# Patient Record
Sex: MALE | Race: WHITE
[De-identification: names, ages, dates, MRNs, and addresses within clinical notes are randomized per-mention and may not be internally consistent; named-entity substitution may affect disease eponyms.]

---

## 2020-01-24 ENCOUNTER — HOSPITAL ENCOUNTER (EMERGENCY)
Dept: HOSPITAL 56 - MW.ED | Age: 25
Discharge: HOME | End: 2020-01-24
Payer: COMMERCIAL
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
## 2020-01-24 DIAGNOSIS — S61.216A: Primary | ICD-10-CM

## 2020-01-24 DIAGNOSIS — Z23: ICD-10-CM

## 2020-01-24 PROCEDURE — 12002 RPR S/N/AX/GEN/TRNK2.6-7.5CM: CPT

## 2020-01-24 PROCEDURE — 90471 IMMUNIZATION ADMIN: CPT

## 2020-01-24 PROCEDURE — 90715 TDAP VACCINE 7 YRS/> IM: CPT

## 2020-01-24 PROCEDURE — 99283 EMERGENCY DEPT VISIT LOW MDM: CPT

## 2020-01-24 PROCEDURE — 73130 X-RAY EXAM OF HAND: CPT

## 2020-01-24 NOTE — CR
Right hand: 4 views of the right hand were obtained which includes a 

lateral view of the fifth digit.

 

Soft tissue injury is seen within the fifth finger.  Joint spaces are 

preserved within the right hand.  No fracture, dislocation or other 

bony abnormality is seen.

 

Impression:

1.  Soft tissue injury within the fifth finger.

2.  No bony abnormality is appreciated.

 

Diagnostic code #2

 

Study was dictated in [place] Standard Time

## 2020-01-24 NOTE — EDM.PDOC
ED HPI GENERAL MEDICAL PROBLEM





- General


Chief Complaint: Upper Extremity Injury/Pain


Stated Complaint: RT HAND/RT PINKIE FINGER INJURY


Time Seen by Provider: 01/24/20 13:39


Source of Information: Reports: Patient


History Limitations: Reports: No Limitations





- History of Present Illness


INITIAL COMMENTS - FREE TEXT/NARRATIVE: 


HISTORY AND PHYSICAL:





History of present illness:


Patient is a 25-year-old male who presents to the ED today with concern of 

right hand pinky injury that occurred just prior to arrival to the ED.  Patient 

states he was changing a tire when the cart fell off the jack and his pinky 

finger was pinched between the bumper of his vehicle and the tire.  Patient 

states he is not up-to-date on his tetanus.  Patient denies any other symptoms 

or concerns.





Patient denies fever, chills, chest pain, shortness of breath, or cough. Denies 

headache, neck stiff ness, change in vision, syncope, or near syncope. Denies 

nausea, vomiting, abdominal pain, diarrhea, constipation, or dysuria. Has not 

noted any blood in urine or stool. Patient has been eating and drinking 

appropriately.





Review of systems: 


As per history of present illness and below otherwise all systems reviewed and 

negative.





Past medical history: 


As per history of present illness and as reviewed below otherwise 

noncontributory.





Surgical history: 


As per history of present illness and as reviewed below otherwise 

noncontributory.





Social history: 


See social history for further information





Family history: 


As per history of present illness and as reviewed below otherwise 

noncontributory.





Physical exam:


General: Patient is alert, oriented, and in no acute distress. Patient sitting 

comfortably on exam table.


HEENT: Atraumatic, normocephalic, pupils equal and reactive bilaterally, 

negative for conjunctival pallor or scleral icterus, mucous membranes moist, 

TMs normal bilaterally, throat clear, neck supple, nontender, trachea midline. 

No drooling or trismus noted. No meningeal signs. No hot potato voice noted. 


Lungs: Clear to auscultation, breath sounds equal bilaterally, chest nontender.


Heart: S1S2, regular rate and rhythm without overt murmur


Abdomen: Soft, nondistended, nontender. Negative for masses or 

hepatosplenomegaly. Negative for costovertebral tenderness.


Pelvis: Stable nontender.


Genitourinary: Deferred.


Rectal: Deferred.


Skin: Intact, warm, dry. No lesions or rashes noted.


Extremities: Negative for cords or calf pain. Neurovascular unremarkable.  

Patient has full range of motion of complete right upper extremity including 

all digits without deficit.  Radial pulses grossly intact of the right upper 

extremity with capillary refill less than 2 seconds. There is a 3.5cm gaping 

laceration of the right medial 5th digit with tendon exposed underlying but not 

damaged and not bleeding.


Neuro: Awake, alert, oriented. Cranial nerves II through XII unremarkable. 

Cerebellum unremarkable. Motor and sensory unremarkable throughout. Exam 

nonfocal.





Notes:


Discussed importance for follow-up with an orthopedic provider.


Voices understanding and is agreeable to plan of care. Denies any further 

questions or concerns at this time.





Diagnostics:


hand XR





Therapeutics:


Lidocaine, bupivicaine, sutures, tdap





Prescription:


Keflex





Impression: 


Finger laceration, right, 5th digit





Plan:


1. Keep the area clean and dry. Continue to monitor for signs of infection as 

discussed. Sutures to be removed in 7-10 days.


2. Tylenol and/or ibuprofen as directed and as needed for pain management and 

discomfort.


3. Please follow-up with orthopedic provider as discussed. Return to the ED as 

needed and as discussed.








Definitive disposition and diagnosis as appropriate pending reevaluation and 

review of above.





  ** R Hand


Pain Score (Numeric/FACES): 5





- Related Data


 Allergies











Allergy/AdvReac Type Severity Reaction Status Date / Time


 


No Known Allergies Allergy   Verified 01/24/20 13:47











Home Meds: 


 Home Meds





. [No Known Home Meds]  01/24/20 [History]











Past Medical History





- Past Health History


Medical/Surgical History: Denies Medical/Surgical History





- Infectious Disease History


Infectious Disease History: Reports: Chicken Pox





Social & Family History





- Family History


Family Medical History: Noncontributory





- Tobacco Use


Smoking Status *Q: Never Smoker


Second Hand Smoke Exposure: No





- Caffeine Use


Caffeine Use: Reports: Coffee





- Recreational Drug Use


Recreational Drug Use: No





Review of Systems





- Review of Systems


Review Of Systems: Comprehensive ROS is negative, except as noted in HPI.





ED EXAM, GENERAL





- Physical Exam


Exam: See Below (see dictation)





ED TRAUMA EXTREMITY PROCEDURES





- Laceration/Wound Repair


  ** Right Digit - 5th (Baby)


Lac/Wound Length In cm: 3.5


Appearance: Muscle, Irregular, Mildly Contaminated


Distal NVT: Neuro & Vascular Intact, No Tendon Injury


Anesthetic Type: Digital


Local Anesthesia - Lidocaine (Xylocaine): 1% Plain


Local Anesthesia - Bupivicaine (Marcaine): 0.5% Plain


Local Anesthetic Volume: Other (10)


Skin Prep: Chlorhexidine (Hibiciens), Saline


Saline Irrigation (cc's): 120


Exploration/Debridement/Repair: Wound Explored, In a Bloodless Field, Explored 

to Base, No Foreign Material Found


Closed With: Sutures


Suture Size: 4-0


# of Sutures: 9


Suture Type: Interrupted


Drain Placement: No


Sterile Dressing Applied: Nurse


Tetanus Status Addressed: Yes


Complications: No





Course





- Vital Signs


Last Recorded V/S: 


 Last Vital Signs











Temp  97.5 F   01/24/20 13:48


 


Pulse  89   01/24/20 13:48


 


Resp  16   01/24/20 13:48


 


BP  149/80 H  01/24/20 13:48


 


Pulse Ox  97   01/24/20 13:48














- Orders/Labs/Meds


Orders: 


 Active Orders 24 hr











 Category Date Time Status


 


 Vaccines to be Administered [RC] PER UNIT ROUTINE Care  01/24/20 13:55 Active











Meds: 


Medications














Discontinued Medications














Generic Name Dose Route Start Last Admin





  Trade Name Freq  PRN Reason Stop Dose Admin


 


Bacitracin  1 dose  01/24/20 15:07  01/24/20 15:18





  Bacitracin Oint 1 Gm  TOP  01/24/20 15:08  1 dose





  ONETIME ONE   Administration





     





     





     





     


 


Bupivacaine HCl  10 ml  01/24/20 13:58  01/24/20 15:04





  Sensorcaine-Mpf 0.5%  INJECT  01/24/20 13:59  10 ml





  ONETIME ONE   Administration





     





     





     





     


 


Diphtheria/Tetanus/Acell Pertussis  0.5 ml  01/24/20 13:55  01/24/20 15:03





  Adacel  IM  01/24/20 13:56  0.5 ml





  .ONCE ONE   Administration





     





     





     





     


 


Lidocaine HCl  5 ml  01/24/20 13:56  01/24/20 15:04





  Xylocaine-Mpf 1%  INJECT  01/24/20 13:57  5 ml





  ONETIME ONE   Administration





     





     





     





     














Departure





- Departure


Time of Disposition: 15:21


Disposition: Home, Self-Care 01


Clinical Impression: 


Finger laceration


Qualifiers:


 Encounter type: initial encounter Finger: little finger Damage to nail status: 

without damage Foreign body presence: without foreign body Laterality: right 

Qualified Code(s): S61.216A - Laceration without foreign body of right little 

finger without damage to nail, initial encounter








- Discharge Information


Referrals: 


PCP,None [Primary Care Provider] - 


Forms:  ED Department Discharge


Additional Instructions: 


The following information is given to patients seen in the emergency department 

who are being discharged to home. This information is to outline your options 

for follow-up care. We provide all patients seen in our emergency department 

with a follow-up referral.





The need for follow-up, as well as the timing and circumstances, are variable 

depending upon the specifics of your emergency department visit.





If you don't have a primary care physician on staff, we will provide you with a 

referral. We always advise you to contact your personal physician following an 

emergency department visit to inform them of the circumstance of the visit and 

for follow-up with them and/or the need for any referrals to a consulting 

specialist.





The emergency department will also refer you to a specialist when appropriate. 

This referral assures that you have the opportunity for follow-up care with a 

specialist. All of these measure are taken in an effort to provide you with 

optimal care, which includes your follow-up.





Under all circumstances we always encourage you to contact your private 

physician who remains a resource for coordinating your care. When calling for 

follow-up care, please make the office aware that this follow-up is from your 

recent emergency room visit. If for any reason you are refused follow-up, 

please contact the [facility] Emergency 

Department at [phone] and asked to speak to the emergency department 

charge nurse.





[facility]


Primary Care


[address] [place] [id number]


Phone: [phone]


Fax: [phone]





[address] [address]


Phone: [phone]


Fax: [phone]





[facility]


Specialty Care - Orthopedic Clinic


[address]


[address] [place] [address], [address]


[place] [address]


Phone: [phone]


 


1. Keep the area clean and dry. Continue to monitor for signs of infection as 

discussed. Sutures to be removed in 7-10 days.


2. Tylenol and/or ibuprofen as directed and as needed for pain management and 

discomfort.


3. Please follow-up with orthopedic provider as discussed. Return to the ED as 

needed and as discussed.











 











Sepsis Event Note





- Evaluation


Sepsis Screening Result: No Definite Risk





- Focused Exam


Vital Signs: 


 Vital Signs











  Temp Pulse Resp BP Pulse Ox


 


 01/24/20 13:48  97.5 F  89  16  149/80 H  97











Date Exam was Performed: 01/24/20


Time Exam was Performed: 15:20





- My Orders


Last 24 Hours: 


My Active Orders





01/24/20 13:55


Vaccines to be Administered [RC] PER UNIT ROUTINE 














- Assessment/Plan


Last 24 Hours: 


My Active Orders





01/24/20 13:55


Vaccines to be Administered [RC] PER UNIT ROUTINE

## 2020-02-05 ENCOUNTER — HOSPITAL ENCOUNTER (EMERGENCY)
Dept: HOSPITAL 56 - MW.ED | Age: 25
Discharge: LEFT BEFORE DISCHARGE | End: 2020-02-05
Payer: COMMERCIAL

## 2020-02-05 DIAGNOSIS — Z53.21: Primary | ICD-10-CM
